# Patient Record
Sex: MALE | Race: OTHER | HISPANIC OR LATINO | Employment: UNEMPLOYED | URBAN - METROPOLITAN AREA
[De-identification: names, ages, dates, MRNs, and addresses within clinical notes are randomized per-mention and may not be internally consistent; named-entity substitution may affect disease eponyms.]

---

## 2019-02-21 ENCOUNTER — OFFICE VISIT (OUTPATIENT)
Dept: FAMILY MEDICINE CLINIC | Facility: CLINIC | Age: 39
End: 2019-02-21
Payer: COMMERCIAL

## 2019-02-21 VITALS
BODY MASS INDEX: 27.82 KG/M2 | DIASTOLIC BLOOD PRESSURE: 70 MMHG | OXYGEN SATURATION: 97 % | HEART RATE: 82 BPM | HEIGHT: 65 IN | WEIGHT: 167 LBS | SYSTOLIC BLOOD PRESSURE: 120 MMHG | TEMPERATURE: 98.2 F

## 2019-02-21 DIAGNOSIS — R22.2 LUMP IN CHEST: ICD-10-CM

## 2019-02-21 DIAGNOSIS — F33.1 MODERATE EPISODE OF RECURRENT MAJOR DEPRESSIVE DISORDER (HCC): Primary | ICD-10-CM

## 2019-02-21 DIAGNOSIS — R09.81 NASAL CONGESTION: ICD-10-CM

## 2019-02-21 DIAGNOSIS — Z23 NEED FOR INFLUENZA VACCINATION: ICD-10-CM

## 2019-02-21 PROCEDURE — 3725F SCREEN DEPRESSION PERFORMED: CPT | Performed by: FAMILY MEDICINE

## 2019-02-21 PROCEDURE — 3008F BODY MASS INDEX DOCD: CPT | Performed by: FAMILY MEDICINE

## 2019-02-21 PROCEDURE — 99214 OFFICE O/P EST MOD 30 MIN: CPT | Performed by: FAMILY MEDICINE

## 2019-02-21 RX ORDER — FLUTICASONE PROPIONATE 50 MCG
2 SPRAY, SUSPENSION (ML) NASAL DAILY
Qty: 16 G | Refills: 0 | Status: SHIPPED | OUTPATIENT
Start: 2019-02-21 | End: 2019-03-26 | Stop reason: ALTCHOICE

## 2019-02-21 RX ORDER — CITALOPRAM 10 MG/1
10 TABLET ORAL DAILY
Qty: 30 TABLET | Refills: 1 | Status: SHIPPED | OUTPATIENT
Start: 2019-02-21 | End: 2019-03-26 | Stop reason: DRUGHIGH

## 2019-02-21 NOTE — PROGRESS NOTES
Subjective:   Chief Complaint   Patient presents with    Depression        Patient ID: Wallace Ledesma is a 43 y o  male  Patient and office concerned about the depression patient in he had been feeling down unhappy a crying every day have difficulty time with sleeping have a difficulty with relation with his family and his kids and this is has been going on for many years and he been refusing to ask for help the patient at age 11 his that he commit suicide and feel this is a non affect him mentally since then he did not see any therapist and he was not on any medication patient deny any suicide attempt diet or idea  Also patient concerned about nasal congestion it has been going on for more than 1 months postnasal drip the runny nose and no fever no chills no decrease in oral intake no sick contact patient does smoke every day half to 1 pack a day  Patient also concerned about the lump on the anterior chest wall is not painful no redness no change in the size but that is there and it is bother him and had concerned about being neck cancer no family history of skin cancer      The following portions of the patient's history were reviewed and updated as appropriate: allergies, current medications, past family history, past medical history, past social history, past surgical history and problem list     Review of Systems   HENT: Positive for postnasal drip and rhinorrhea  Negative for ear pain, sinus pressure and sinus pain  Eyes: Negative for pain and redness  Respiratory: Negative for chest tightness and shortness of breath  Cardiovascular: Negative for palpitations and leg swelling  Gastrointestinal: Negative for blood in stool, constipation and diarrhea  Genitourinary: Negative for flank pain, frequency and hematuria  Musculoskeletal: Negative for back pain  Neurological: Negative for dizziness  Hematological: Does not bruise/bleed easily     Psychiatric/Behavioral: Positive for decreased concentration and sleep disturbance  Negative for self-injury and suicidal ideas  The patient is not nervous/anxious  Depress mood,         Objective:  Vitals:    02/21/19 1549   BP: 120/70   Pulse: 82   Temp: 98 2 °F (36 8 °C)   TempSrc: Oral   SpO2: 97%   Weight: 75 8 kg (167 lb)   Height: 5' 4 5" (1 638 m)      Physical Exam   Constitutional: He is oriented to person, place, and time  He appears well-developed and well-nourished  HENT:   Head: Normocephalic  Right Ear: External ear normal    Left Ear: External ear normal    Eyes: Conjunctivae and EOM are normal  Right eye exhibits no discharge  Left eye exhibits no discharge  Neck: No JVD present  Cardiovascular: Normal rate, regular rhythm and normal heart sounds  Exam reveals no gallop  No murmur heard  Pulmonary/Chest: Effort normal  No respiratory distress  He has no wheezes  He has no rales  He exhibits no tenderness  Abdominal: He exhibits no mass  There is no tenderness  There is no rebound  Musculoskeletal: He exhibits no edema or tenderness  Neurological: He is alert and oriented to person, place, and time  Skin: No rash noted  No erythema  Palpable small lump 1 cm in size on anterior chest wll ,no erythema no tenderness          Assessment/Plan:     Moderate episode of recurrent major depressive disorder (HCC)  Acute symptomatic will start the patient on citalopram 10 mg once a day proper use of medication possible side effect discussed with the patient will refer the patient to see Psychology    Nasal congestion  Acute symptomatic Flonase nasal spray 2 spray in each nostril once a day for 2 weeks in the road proper use of medication possible side effect discussed with the patient    Lump in chest  A new diagnosis the most probably lipoma plan to do ultrasound of the a lump the we discussed the patient not sure of the problem and the treatment is only surgical excision he will hold on it until see the result of the ultrasound       Diagnoses and all orders for this visit:    Moderate episode of recurrent major depressive disorder (HCC)  -     citalopram (CeleXA) 10 mg tablet; Take 1 tablet (10 mg total) by mouth daily  -     Ambulatory referral to Psychology; Future    Nasal congestion  -     fluticasone (FLONASE) 50 mcg/act nasal spray; 2 sprays into each nostril daily    Lump in chest  -     US chest (lungs/pleural cavity);  Future    Need for influenza vaccination  -     SYRINGE/SINGLE-DOSE VIAL: influenza vaccine, 3046-0036, quadrivalent, 0 5 mL, preservative-free, for patients 3+ yr (2 [facility])

## 2019-02-22 PROBLEM — R22.2 LUMP IN CHEST: Status: ACTIVE | Noted: 2019-02-22

## 2019-02-22 PROBLEM — R09.81 NASAL CONGESTION: Status: ACTIVE | Noted: 2019-02-22

## 2019-02-22 PROBLEM — F33.1 MODERATE EPISODE OF RECURRENT MAJOR DEPRESSIVE DISORDER (HCC): Status: ACTIVE | Noted: 2019-02-22

## 2019-02-22 NOTE — ASSESSMENT & PLAN NOTE
A new diagnosis the most probably lipoma plan to do ultrasound of the a lump the we discussed the patient not sure of the problem and the treatment is only surgical excision he will hold on it until see the result of the ultrasound

## 2019-02-22 NOTE — ASSESSMENT & PLAN NOTE
Acute symptomatic Flonase nasal spray 2 spray in each nostril once a day for 2 weeks in the road proper use of medication possible side effect discussed with the patient

## 2019-02-22 NOTE — ASSESSMENT & PLAN NOTE
Acute symptomatic will start the patient on citalopram 10 mg once a day proper use of medication possible side effect discussed with the patient will refer the patient to see Psychology

## 2019-02-25 ENCOUNTER — HOSPITAL ENCOUNTER (OUTPATIENT)
Dept: ULTRASOUND IMAGING | Facility: HOSPITAL | Age: 39
Discharge: HOME | End: 2019-02-25
Payer: COMMERCIAL

## 2019-02-25 DIAGNOSIS — R22.2 LUMP IN CHEST: ICD-10-CM

## 2019-02-25 PROCEDURE — 76705 ECHO EXAM OF ABDOMEN: CPT

## 2019-03-26 ENCOUNTER — OFFICE VISIT (OUTPATIENT)
Dept: FAMILY MEDICINE CLINIC | Facility: CLINIC | Age: 39
End: 2019-03-26
Payer: COMMERCIAL

## 2019-03-26 VITALS
SYSTOLIC BLOOD PRESSURE: 120 MMHG | TEMPERATURE: 98.1 F | HEART RATE: 76 BPM | BODY MASS INDEX: 27.16 KG/M2 | HEIGHT: 65 IN | OXYGEN SATURATION: 98 % | WEIGHT: 163 LBS | DIASTOLIC BLOOD PRESSURE: 80 MMHG

## 2019-03-26 DIAGNOSIS — E66.3 OVERWEIGHT (BMI 25.0-29.9): ICD-10-CM

## 2019-03-26 DIAGNOSIS — Z00.01 ENCOUNTER FOR WELL ADULT EXAM WITH ABNORMAL FINDINGS: Primary | ICD-10-CM

## 2019-03-26 DIAGNOSIS — F33.1 MODERATE EPISODE OF RECURRENT MAJOR DEPRESSIVE DISORDER (HCC): ICD-10-CM

## 2019-03-26 DIAGNOSIS — F17.210 SMOKING GREATER THAN 20 PACK YEARS: ICD-10-CM

## 2019-03-26 DIAGNOSIS — D17.1 LIPOMA OF TORSO: ICD-10-CM

## 2019-03-26 PROCEDURE — 3008F BODY MASS INDEX DOCD: CPT | Performed by: FAMILY MEDICINE

## 2019-03-26 PROCEDURE — 99406 BEHAV CHNG SMOKING 3-10 MIN: CPT | Performed by: FAMILY MEDICINE

## 2019-03-26 PROCEDURE — 3725F SCREEN DEPRESSION PERFORMED: CPT | Performed by: FAMILY MEDICINE

## 2019-03-26 PROCEDURE — 99395 PREV VISIT EST AGE 18-39: CPT | Performed by: FAMILY MEDICINE

## 2019-03-26 PROCEDURE — 99214 OFFICE O/P EST MOD 30 MIN: CPT | Performed by: FAMILY MEDICINE

## 2019-03-26 RX ORDER — CITALOPRAM 20 MG/1
20 TABLET ORAL DAILY
Qty: 30 TABLET | Refills: 2 | Status: SHIPPED | OUTPATIENT
Start: 2019-03-26 | End: 2020-03-19

## 2019-03-26 RX ORDER — NICOTINE 21 MG/24HR
1 PATCH, TRANSDERMAL 24 HOURS TRANSDERMAL EVERY 24 HOURS
Qty: 28 PATCH | Refills: 0 | Status: SHIPPED | OUTPATIENT
Start: 2019-03-26 | End: 2020-03-19

## 2019-03-26 NOTE — ASSESSMENT & PLAN NOTE
Asymptomatic uncontrolled will increase citalopram to 20 mg 1 tablet once a day proper use of medication possible side effect discussed with the patient

## 2019-03-26 NOTE — ASSESSMENT & PLAN NOTE
finding on ultrasound the of the anterior chest wall and plan to refer to surgeon patient concerned about it would like to be removed

## 2019-03-26 NOTE — PROGRESS NOTES
Subjective:   Chief Complaint   Patient presents with    Physical Exam    Follow-up     1 month f/u        Patient ID: Isaiah Cohen is a 43 y o  male  Patient office for his annual physical exam follow-up with the depression and patient was started on citalopram and he just started 3 weeks ago patient does feel very slight improvement the head deny any crying spell sleep the no improvement and his smooth still going into the depressed mood affect on his relation with the family but he deny any suicide attempt or idea patient did try her to start the the psychotherapist and he was not successful with the therapist he did not have connection with her patient tolerated the citalopram without any side effect      The following portions of the patient's history were reviewed and updated as appropriate: allergies, current medications, past family history, past medical history, past social history, past surgical history and problem list     Review of Systems   HENT: Negative for ear pain, sinus pressure and sinus pain  Eyes: Negative for pain and redness  Respiratory: Negative for chest tightness and shortness of breath  Cardiovascular: Negative for palpitations and leg swelling  Gastrointestinal: Negative for blood in stool, constipation and diarrhea  Genitourinary: Negative for flank pain, frequency and hematuria  Musculoskeletal: Negative for back pain  Neurological: Negative for dizziness  Hematological: Does not bruise/bleed easily  Psychiatric/Behavioral: Negative for self-injury and suicidal ideas  Objective:  Vitals:    03/26/19 1554   BP: 120/80   Pulse: 76   Temp: 98 1 °F (36 7 °C)   TempSrc: Oral   SpO2: 98%   Weight: 73 9 kg (163 lb)   Height: 5' 4 5" (1 638 m)      Physical Exam   Constitutional: He is oriented to person, place, and time  He appears well-developed and well-nourished  HENT:   Head: Normocephalic     Right Ear: External ear normal    Left Ear: External ear normal    Eyes: Conjunctivae and EOM are normal  Right eye exhibits no discharge  Left eye exhibits no discharge  Neck: No JVD present  Cardiovascular: Normal rate, regular rhythm and normal heart sounds  Exam reveals no gallop  No murmur heard  Pulmonary/Chest: Effort normal  No respiratory distress  He has no wheezes  He has no rales  He exhibits no tenderness  Abdominal: He exhibits no mass  There is no tenderness  There is no rebound  Musculoskeletal: He exhibits no edema or tenderness  Neurological: He is alert and oriented to person, place, and time  Skin: No rash noted  No erythema  Assessment/Plan: Moderate episode of recurrent major depressive disorder (HCC)  Asymptomatic uncontrolled will increase citalopram to 20 mg 1 tablet once a day proper use of medication possible side effect discussed with the patient    Smoking greater than 20 pack years  I advised patient to quit, and offered support  Discussed current use pattern  Asked patient to inform me when they set a quit date     At discussed with the patient complication of for smoking increase the risk of multiple kind of cancer he is aware  We offer him script for nicotine patch patient agree will start the patient on Decadron patch 21 mg 1 patch a day for 4 week proper use of medication possible side effect discussed with the patient                  Encounter for well adult exam with abnormal findings  Advice and education were given regarding nutrition, aerobic exercises, weight bearing exercises, cardiovascular risk reduction, fall risk reduction, and age appropriate supplements  The patient was counseled regarding instructions for management, risk factor reductions, prognosis, risks and benefits of treatment options, patient and family education, and importance of compliance with treatment     Patient declined a Pneumovax 23 and he declined Tdap      Lipoma of torso   finding on ultrasound the of the anterior chest wall and plan to refer to surgeon patient concerned about it would like to be removed    Overweight (BMI 25 0-29  9)  The BMI is above average  BMI counseling and education was provided to the patient  Nutrition recommendations include reducing portion sizes, decreasing overall calorie intake, 3-5 servings of fruits/vegetables daily, reducing fast food intake, consuming healthier snacks, decreasing soda and/or juice intake, moderation in carbohydrate intake and reducing intake of saturated fat and trans fat  Exercise recommendations include moderate aerobic physical activity for 150 minutes/week, exercising 3-5 times per week and joining a gym  Diagnoses and all orders for this visit:    Lipoma of torso  -     Cancel: Ambulatory referral to General Surgery; Future  -     Ambulatory referral to General Surgery; Future    Encounter for well adult exam with abnormal findings  -     CBC and differential; Future  -     Basic metabolic panel; Future  -     TSH, 3rd generation with Free T4 reflex; Future  -     Lipid panel; Future    Moderate episode of recurrent major depressive disorder (HCC)  -     citalopram (CeleXA) 20 mg tablet; Take 1 tablet (20 mg total) by mouth daily    Smoking greater than 20 pack years  -     nicotine (NICODERM CQ) 21 mg/24 hr TD 24 hr patch; Place 1 patch on the skin every 24 hours    Overweight (BMI 25 0-29  9)

## 2019-03-26 NOTE — ASSESSMENT & PLAN NOTE
Ultrasound show lipoma on the anterior chest wall patient concerned about it he will like to take it off for refer the patient to see sheila

## 2019-03-26 NOTE — PATIENT INSTRUCTIONS

## 2019-03-26 NOTE — PROGRESS NOTES
FAMILY PRACTICE HEALTH MAINTENANCE OFFICE VISIT  [facility]    NAME: Osmin Quiñones  AGE: 43 y o  SEX: male  : 1980     DATE: 3/26/2019    Assessment and Plan     Problem List Items Addressed This Visit        Other    Moderate episode of recurrent major depressive disorder ([address] )     Asymptomatic uncontrolled will increase citalopram to 20 mg 1 tablet once a day proper use of medication possible side effect discussed with the patient         Relevant Medications    nicotine (NICODERM CQ) 21 mg/24 hr TD 24 hr patch    citalopram (CeleXA) 20 mg tablet    Encounter for well adult exam with abnormal findings - Primary     Advice and education were given regarding nutrition, aerobic exercises, weight bearing exercises, cardiovascular risk reduction, fall risk reduction, and age appropriate supplements  The patient was counseled regarding instructions for management, risk factor reductions, prognosis, risks and benefits of treatment options, patient and family education, and importance of compliance with treatment  Patient declined a Pneumovax 23 and he declined Tdap           Relevant Orders    CBC and differential    Basic metabolic panel    TSH, 3rd generation with Free T4 reflex    Lipid panel    Smoking greater than 20 pack years     I advised patient to quit, and offered support  Discussed current use pattern  Asked patient to inform me when they set a quit date     At discussed with the patient complication of for smoking increase the risk of multiple kind of cancer he is aware  We offer him script for nicotine patch patient agree will start the patient on Decadron patch 21 mg 1 patch a day for 4 week proper use of medication possible side effect discussed with the patient                       Relevant Medications    nicotine (NICODERM CQ) 21 mg/24 hr TD 24 hr patch    Overweight (BMI 25 0-29  9)     The BMI is above average   BMI counseling and education was provided to the patient  Nutrition recommendations include reducing portion sizes, decreasing overall calorie intake, 3-5 servings of fruits/vegetables daily, reducing fast food intake, consuming healthier snacks, decreasing soda and/or juice intake, moderation in carbohydrate intake and reducing intake of saturated fat and trans fat  Exercise recommendations include moderate aerobic physical activity for 150 minutes/week, exercising 3-5 times per week and joining a gym  Lipoma of torso      finding on ultrasound the of the anterior chest wall and plan to refer to surgeon patient concerned about it would like to be removed         Relevant Orders    Ambulatory referral to General Surgery            · Patient Counseling:   · Nutrition: Stressed importance of a well balanced diet, moderation of sodium/saturated fat, caloric balance and sufficient intake of fiber  · Exercise: Stressed the importance of regular exercise with a goal of 150 minutes per week  · Dental Health: Discussed daily flossing and brushing and regular dental visits     · Immunizations reviewed patient is due for Pneumovax 23 patient is due for tetanus shot and he decline it  · Discussed benefits of screening the patient is due for blood work screening for hyperlipidemia and diabetic  BMI Counseling: Body mass index is 27 55 kg/m²  Discussed with patient's BMI with him         Chief Complaint     Chief Complaint   Patient presents with    Physical Exam    Follow-up     1 month f/u       History of Present Illness     Patient office for his annual exam and follow-up of low with the ultrasound the lump of the chest folic  depression   Patient deny any chest pain short of breath no palpitation no headache no blurred vision no weakness or lateralized of the symptom no abdomen pain nausea vomiting or diarrhea no renal problem no rash no fever no change in the weight and no cough no wheezing in no eye pain on or discharge and no ear pain and patient does smoke has been smoking more than 20 years and he is willing to could to smoking he does not do exercise and it isn't follow any special diet patient depression screen positive he had diagnosis of depression he is on citalopram 10 mg once a day      Well Adult Physical   Patient here for a comprehensive physical exam       Diet and Physical Activity  Diet:  Regular diet  Exercise: never      Depression Screen  PHQ-9 Depression Screening    PHQ-9:    Frequency of the following problems over the past two weeks:       Little interest or pleasure in doing things:  1 - several days  Feeling down, depressed, or hopeless:  1 - several days  Trouble falling or staying asleep, or sleeping too much:  3 - nearly every day  Feeling tired or having little energy:  3 - nearly every day  Poor appetite or overeatin - several days  Feeling bad about yourself - or that you are a failure or have let yourself or your family down:  1 - several days  Trouble concentrating on things, such as reading the newspaper or watching television:  1 - several days  Moving or speaking so slowly that other people could have noticed  Or the opposite - being so fidgety or restless that you have been moving around a lot more than usual:  0 - not at all  Thoughts that you would be better off dead, or of hurting yourself in some way:  0 - not at all  PHQ-2 Score:  2  PHQ-9 Score:  11          General Health  Hearing: Normal:  bilateral  Vision: no vision problems  Dental: regular dental visits      The following portions of the patient's history were reviewed and updated as appropriate: allergies, current medications, past family history, past medical history, past social history, past surgical history and problem list     Review of Systems     Review of Systems   Constitutional: Negative for fatigue and fever  HENT: Negative for ear pain, sinus pressure, sinus pain and sore throat      Eyes: Negative for pain and redness  Respiratory: Negative for cough, chest tightness and shortness of breath  Cardiovascular: Negative for chest pain, palpitations and leg swelling  Gastrointestinal: Negative for abdominal pain, blood in stool, constipation, diarrhea and nausea  Endocrine: Negative for heat intolerance and polydipsia  Genitourinary: Negative for flank pain, frequency and hematuria  Musculoskeletal: Negative for back pain and joint swelling  Skin: Negative for rash  Neurological: Negative for dizziness, numbness and headaches  Hematological: Does not bruise/bleed easily  Past Medical History     Past Medical History:   Diagnosis Date    Moderate episode of recurrent major depressive disorder ([facility] 2/22/2019       Past Surgical History     History reviewed  No pertinent surgical history      Social History     Social History     Socioeconomic History    Marital status: Single     Spouse name: None    Number of children: None    Years of education: None    Highest education level: None   Occupational History    None   Social Needs    Financial resource strain: Not hard at all   [place] insecurity:     Worry: Never true     Inability: Never true    Transportation needs:     Medical: No     Non-medical: No   Tobacco Use    Smoking status: Current Every Day Smoker    Smokeless tobacco: Current User   Substance and Sexual Activity    Alcohol use: Yes     Comment: occasional     Drug use: No    Sexual activity: Yes     Partners: Female   Lifestyle    Physical activity:     Days per week: 0 days     Minutes per session: 0 min    Stress: Rather much   Relationships    Social connections:     Talks on phone: More than three times a week     Gets together: More than three times a week     Attends Church service: Never     Active member of club or organization: No     Attends meetings of clubs or organizations: Never     Relationship status: Never     Intimate partner violence: Fear of current or ex partner: No     Emotionally abused: No     Physically abused: No     Forced sexual activity: No   Other Topics Concern    None   Social History Narrative    Children: Y    Hand dominance: R       Family History     Family History   Problem Relation Age of Onset    Asthma Mother     Hypertension Mother        Current Medications       Current Outpatient Medications:     citalopram (CeleXA) 20 mg tablet, Take 1 tablet (20 mg total) by mouth daily, Disp: 30 tablet, Rfl: 2    nicotine (NICODERM CQ) 21 mg/24 hr TD 24 hr patch, Place 1 patch on the skin every 24 hours, Disp: 28 patch, Rfl: 0     Allergies     No Known Allergies    Objective     /80   Pulse 76   Temp 98 1 °F (36 7 °C) (Oral)   Ht 5' 4 5" (1 638 m)   Wt 73 9 kg (163 lb)   SpO2 98%   BMI 27 55 kg/m²      Physical Exam   Constitutional: He is oriented to person, place, and time  He appears well-developed and well-nourished  HENT:   Head: Normocephalic  Right Ear: External ear normal    Left Ear: External ear normal    Eyes: Conjunctivae and EOM are normal  Right eye exhibits no discharge  Left eye exhibits no discharge  Neck: No JVD present  Cardiovascular: Normal rate, regular rhythm and normal heart sounds  Exam reveals no gallop  No murmur heard  Pulmonary/Chest: Effort normal  No respiratory distress  He has no wheezes  He has no rales  He exhibits no tenderness  Abdominal: He exhibits no mass  There is no tenderness  There is no rebound  Musculoskeletal: He exhibits no edema or tenderness  Neurological: He is alert and oriented to person, place, and time  Skin: No rash noted  No erythema  Psychiatric: He has a normal mood and affect  Mazin Stevenson MD  [facility]  BMI Counseling: Body mass index is 27 55 kg/m²  Discussed the patient's BMI with him  The BMI is above average  BMI counseling and education was provided to the patient   Nutrition recommendations include reducing portion sizes, decreasing overall calorie intake, 3-5 servings of fruits/vegetables daily, reducing fast food intake, consuming healthier snacks, decreasing soda and/or juice intake, moderation in carbohydrate intake and reducing intake of cholesterol  Exercise recommendations include moderate aerobic physical activity for 150 minutes/week

## 2019-03-26 NOTE — ASSESSMENT & PLAN NOTE
I advised patient to quit, and offered support  Discussed current use pattern  Asked patient to inform me when they set a quit date     At discussed with the patient complication of for smoking increase the risk of multiple kind of cancer he is aware  We offer him script for nicotine patch patient agree will start the patient on Decadron patch 21 mg 1 patch a day for 4 week proper use of medication possible side effect discussed with the patient

## 2019-03-26 NOTE — ASSESSMENT & PLAN NOTE
Advice and education were given regarding nutrition, aerobic exercises, weight bearing exercises, cardiovascular risk reduction, fall risk reduction, and age appropriate supplements  The patient was counseled regarding instructions for management, risk factor reductions, prognosis, risks and benefits of treatment options, patient and family education, and importance of compliance with treatment     Patient declined a Pneumovax 23 and he declined Tdap

## 2019-04-08 ENCOUNTER — CONSULT (OUTPATIENT)
Dept: SURGERY | Facility: CLINIC | Age: 39
End: 2019-04-08
Payer: COMMERCIAL

## 2019-04-08 VITALS
TEMPERATURE: 97.5 F | DIASTOLIC BLOOD PRESSURE: 78 MMHG | BODY MASS INDEX: 27.99 KG/M2 | HEIGHT: 65 IN | WEIGHT: 168 LBS | HEART RATE: 69 BPM | SYSTOLIC BLOOD PRESSURE: 126 MMHG

## 2019-04-08 DIAGNOSIS — D17.1 LIPOMA OF TORSO: ICD-10-CM

## 2019-04-08 PROCEDURE — 99243 OFF/OP CNSLTJ NEW/EST LOW 30: CPT | Performed by: PHYSICIAN ASSISTANT

## 2019-06-27 ENCOUNTER — OFFICE VISIT (OUTPATIENT)
Dept: SURGERY | Facility: CLINIC | Age: 39
End: 2019-06-27
Payer: COMMERCIAL

## 2019-06-27 VITALS
WEIGHT: 169.8 LBS | BODY MASS INDEX: 28.7 KG/M2 | SYSTOLIC BLOOD PRESSURE: 122 MMHG | DIASTOLIC BLOOD PRESSURE: 80 MMHG | TEMPERATURE: 98.3 F

## 2019-06-27 DIAGNOSIS — R22.2 LUMP IN CHEST: ICD-10-CM

## 2019-06-27 DIAGNOSIS — D17.1 LIPOMA OF TORSO: Primary | ICD-10-CM

## 2019-06-27 PROCEDURE — 99213 OFFICE O/P EST LOW 20 MIN: CPT | Performed by: PHYSICIAN ASSISTANT

## 2019-06-27 NOTE — H&P (VIEW-ONLY)
Assessment/Plan:   Brandon Horvaht is a 43 y o male who is here for The primary encounter diagnosis was Lipoma of torso  A diagnosis of Lump in chest was also pertinent to this visit  On exam found to have lipoma x 3 of the : mid sternum chest wall  Plan: Excise lesion(s) under anesthesia in the operating room      Positioning: supine    Post Op Pain Management:   Motrin and Tylenol    - Patient has been instructed to avoid herbs or non-directed vitamins the week prior to surgery to ensure no drug interactions with perioperative surgical and anesthetic medications  - Patient has been instructed to avoid aspirin containing medications or non-steroidal anti-inflammatory drugs for SEVEN days preceding surgery  Preoperative Clearance: None    Physical Exam   Pulmonary/Chest:             _______________________________________________________  CC:Lipoma (on chest )    HPI:  Brandon Horvath is a 43 y o male who was referred for evaluation of Lipoma (on chest )    Currently patient reports growing masses of chest      Reports: growing    Location: chest      ROS:  General ROS: negative  negative for - chills, fatigue, fever or night sweats, weight loss  Respiratory ROS: no cough, shortness of breath, or wheezing  Cardiovascular ROS: no chest pain or dyspnea on exertion  Genito-Urinary ROS: no dysuria, trouble voiding, or hematuria  Musculoskeletal ROS: negative for - gait disturbance, joint pain or muscle pain  Neurological ROS: no TIA or stroke symptoms  Skin ROS: See HPI  GI ROS: see HPI  Skin ROS: no new rashes or lesions   Lymphatic ROS: no new adenopathy noted by pt     GYN ROS: see HPI, no new GYN history or bleeding noted  Psy ROS: no new mental or behavioral disturbances       Patient Active Problem List   Diagnosis    Moderate episode of recurrent major depressive disorder (HCC)    Nasal congestion    Lump in chest    Encounter for well adult exam with abnormal findings    Smoking greater than 20 pack years    Overweight (BMI 25 0-29  9)    Lipoma of torso         Allergies:  Patient has no known allergies  Current Outpatient Medications:     citalopram (CeleXA) 20 mg tablet, Take 1 tablet (20 mg total) by mouth daily (Patient not taking: Reported on 6/27/2019), Disp: 30 tablet, Rfl: 2    nicotine (NICODERM CQ) 21 mg/24 hr TD 24 hr patch, Place 1 patch on the skin every 24 hours (Patient not taking: Reported on 6/27/2019), Disp: 28 patch, Rfl: 0    Past Medical History:   Diagnosis Date    Moderate episode of recurrent major depressive disorder ([facility] [address] ) 2/22/2019       No past surgical history on file  Family History   Problem Relation Age of Onset    Asthma Mother     Hypertension Mother         reports that he has been smoking  He uses smokeless tobacco  He reports that he drinks alcohol  He reports that he does not use drugs  Vitals:    06/27/19 1526   BP: 122/80   Temp: 98 3 °F (36 8 °C)        PHYSICAL EXAM  General Appearance:    Alert, cooperative, no distress   Head:    Normocephalic without obvious abnormality   Eyes:    PERRL, conjunctiva/corneas clear, EOM's intact        Neck:   Supple, no adenopathy, no JVD   Back:     Symmetric, no spinal or CVA tenderness   Lungs:     Clear to auscultation bilaterally, no wheezing or rhonchi   Heart:    Regular rate and rhythm, S1 and S2 normal, no murmur   Abdomen:     Benign, no rebound or guarding  Extremities:   Extremities normal  No clubbing, cyanosis or edema   Psych:   Normal Affect, AOx3  Neurologic:  Skin:   CNII-XII intact  Strength symmetric, speech intact    Warm, dry, intact, no visible rashes or lesions except as follows: 3 ,2 cm masses of mid setrnum               Some portions of this record may have been generated with voice recognition software  There may be translation, syntax,  or grammatical errors   Occasional wrong word or "sound-a-like" substitutions may have occurred due to the inherent limitations of the voice recognition software  Read the chart carefully and recognize, using context, where substitutions may have occurred  If you have any questions, please contact the dictating provider for clarification or correction, as needed  This encounter has been coded by a non-certified coder         Nathen Sacks, MD    Date: 6/27/2019 Time: 3:33 PM

## 2019-07-08 NOTE — PRE-PROCEDURE INSTRUCTIONS
No outpatient medications have been marked as taking for the 7/11/19 encounter [facility] Encounter)  Patient given/ instructed on use of Dial antibac soap per hospital protocol    Patient instructed to stop all ASA, NSAIDS, vitamins and herbal supplements one week prior to surgery or per Dr Piter Olivares

## 2019-07-10 ENCOUNTER — ANESTHESIA EVENT (OUTPATIENT)
Dept: PERIOP | Facility: HOSPITAL | Age: 39
End: 2019-07-10
Payer: COMMERCIAL

## 2019-07-11 ENCOUNTER — ANESTHESIA (OUTPATIENT)
Dept: PERIOP | Facility: HOSPITAL | Age: 39
End: 2019-07-11
Payer: COMMERCIAL

## 2019-07-11 ENCOUNTER — HOSPITAL ENCOUNTER (OUTPATIENT)
Facility: HOSPITAL | Age: 39
Setting detail: OUTPATIENT SURGERY
Discharge: HOME | End: 2019-07-11
Attending: SURGERY | Admitting: SURGERY
Payer: COMMERCIAL

## 2019-07-11 VITALS
HEART RATE: 53 BPM | RESPIRATION RATE: 20 BRPM | DIASTOLIC BLOOD PRESSURE: 70 MMHG | TEMPERATURE: 97.7 F | BODY MASS INDEX: 28.32 KG/M2 | HEIGHT: 65 IN | OXYGEN SATURATION: 100 % | SYSTOLIC BLOOD PRESSURE: 108 MMHG | WEIGHT: 170 LBS

## 2019-07-11 DIAGNOSIS — D17.1 LIPOMA OF TORSO: Primary | ICD-10-CM

## 2019-07-11 PROCEDURE — 21555 EXC NECK LES SC < 3 CM: CPT | Performed by: SURGERY

## 2019-07-11 PROCEDURE — 88304 TISSUE EXAM BY PATHOLOGIST: CPT | Performed by: PATHOLOGY

## 2019-07-11 RX ORDER — SODIUM CHLORIDE 9 MG/ML
125 INJECTION, SOLUTION INTRAVENOUS CONTINUOUS
Status: DISCONTINUED | OUTPATIENT
Start: 2019-07-11 | End: 2019-07-11 | Stop reason: HOSPADM

## 2019-07-11 RX ORDER — ONDANSETRON 2 MG/ML
INJECTION INTRAMUSCULAR; INTRAVENOUS AS NEEDED
Status: DISCONTINUED | OUTPATIENT
Start: 2019-07-11 | End: 2019-07-11 | Stop reason: SURG

## 2019-07-11 RX ORDER — ACETAMINOPHEN 325 MG/1
650 TABLET ORAL EVERY 6 HOURS PRN
Status: DISCONTINUED | OUTPATIENT
Start: 2019-07-11 | End: 2019-07-11 | Stop reason: HOSPADM

## 2019-07-11 RX ORDER — ONDANSETRON 2 MG/ML
4 INJECTION INTRAMUSCULAR; INTRAVENOUS ONCE AS NEEDED
Status: DISCONTINUED | OUTPATIENT
Start: 2019-07-11 | End: 2019-07-11 | Stop reason: HOSPADM

## 2019-07-11 RX ORDER — ONDANSETRON 2 MG/ML
4 INJECTION INTRAMUSCULAR; INTRAVENOUS EVERY 8 HOURS PRN
Status: DISCONTINUED | OUTPATIENT
Start: 2019-07-11 | End: 2019-07-11 | Stop reason: HOSPADM

## 2019-07-11 RX ORDER — HYDROMORPHONE HCL/PF 1 MG/ML
1 SYRINGE (ML) INJECTION
Status: DISCONTINUED | OUTPATIENT
Start: 2019-07-11 | End: 2019-07-11 | Stop reason: HOSPADM

## 2019-07-11 RX ORDER — ONDANSETRON 4 MG/1
4 TABLET, ORALLY DISINTEGRATING ORAL EVERY 8 HOURS PRN
Status: DISCONTINUED | OUTPATIENT
Start: 2019-07-11 | End: 2019-07-11 | Stop reason: HOSPADM

## 2019-07-11 RX ORDER — FENTANYL CITRATE/PF 50 MCG/ML
50 SYRINGE (ML) INJECTION
Status: DISCONTINUED | OUTPATIENT
Start: 2019-07-11 | End: 2019-07-11 | Stop reason: HOSPADM

## 2019-07-11 RX ORDER — DEXTROSE AND SODIUM CHLORIDE 5; .45 G/100ML; G/100ML
80 INJECTION, SOLUTION INTRAVENOUS CONTINUOUS
Status: DISCONTINUED | OUTPATIENT
Start: 2019-07-11 | End: 2019-07-11 | Stop reason: HOSPADM

## 2019-07-11 RX ORDER — CEFAZOLIN SODIUM 1 G/50ML
1000 SOLUTION INTRAVENOUS ONCE
Status: COMPLETED | OUTPATIENT
Start: 2019-07-11 | End: 2019-07-11

## 2019-07-11 RX ORDER — DEXAMETHASONE SODIUM PHOSPHATE 4 MG/ML
INJECTION, SOLUTION INTRA-ARTICULAR; INTRALESIONAL; INTRAMUSCULAR; INTRAVENOUS; SOFT TISSUE AS NEEDED
Status: DISCONTINUED | OUTPATIENT
Start: 2019-07-11 | End: 2019-07-11 | Stop reason: SURG

## 2019-07-11 RX ORDER — HYDROCODONE BITARTRATE AND ACETAMINOPHEN 5; 325 MG/1; MG/1
1 TABLET ORAL EVERY 4 HOURS PRN
Qty: 5 TABLET | Refills: 0 | Status: SHIPPED | OUTPATIENT
Start: 2019-07-11 | End: 2020-03-19

## 2019-07-11 RX ORDER — HYDROCODONE BITARTRATE AND ACETAMINOPHEN 5; 325 MG/1; MG/1
1 TABLET ORAL EVERY 4 HOURS PRN
Status: DISCONTINUED | OUTPATIENT
Start: 2019-07-11 | End: 2019-07-11 | Stop reason: HOSPADM

## 2019-07-11 RX ORDER — PROPOFOL 10 MG/ML
INJECTION, EMULSION INTRAVENOUS AS NEEDED
Status: DISCONTINUED | OUTPATIENT
Start: 2019-07-11 | End: 2019-07-11 | Stop reason: SURG

## 2019-07-11 RX ORDER — MIDAZOLAM HYDROCHLORIDE 1 MG/ML
INJECTION INTRAMUSCULAR; INTRAVENOUS AS NEEDED
Status: DISCONTINUED | OUTPATIENT
Start: 2019-07-11 | End: 2019-07-11 | Stop reason: SURG

## 2019-07-11 RX ORDER — FENTANYL CITRATE 50 UG/ML
INJECTION, SOLUTION INTRAMUSCULAR; INTRAVENOUS AS NEEDED
Status: DISCONTINUED | OUTPATIENT
Start: 2019-07-11 | End: 2019-07-11 | Stop reason: SURG

## 2019-07-11 RX ADMIN — FENTANYL CITRATE 25 MCG: 50 INJECTION, SOLUTION INTRAMUSCULAR; INTRAVENOUS at 10:45

## 2019-07-11 RX ADMIN — FENTANYL CITRATE 25 MCG: 50 INJECTION, SOLUTION INTRAMUSCULAR; INTRAVENOUS at 10:55

## 2019-07-11 RX ADMIN — CEFAZOLIN SODIUM 1000 MG: 1 SOLUTION INTRAVENOUS at 10:40

## 2019-07-11 RX ADMIN — MIDAZOLAM 2 MG: 1 INJECTION INTRAMUSCULAR; INTRAVENOUS at 10:30

## 2019-07-11 RX ADMIN — SODIUM CHLORIDE 125 ML/HR: 0.9 INJECTION, SOLUTION INTRAVENOUS at 08:40

## 2019-07-11 RX ADMIN — LIDOCAINE HYDROCHLORIDE 100 MG: 20 INJECTION, SOLUTION INTRAVENOUS at 10:42

## 2019-07-11 RX ADMIN — PROPOFOL 200 MG: 10 INJECTION, EMULSION INTRAVENOUS at 10:42

## 2019-07-11 RX ADMIN — FENTANYL CITRATE 25 MCG: 50 INJECTION, SOLUTION INTRAMUSCULAR; INTRAVENOUS at 10:50

## 2019-07-11 RX ADMIN — ONDANSETRON HYDROCHLORIDE 4 MG: 2 INJECTION, SOLUTION INTRAVENOUS at 10:30

## 2019-07-11 RX ADMIN — DEXAMETHASONE SODIUM PHOSPHATE 4 MG: 4 INJECTION, SOLUTION INTRAMUSCULAR; INTRAVENOUS at 10:51

## 2019-07-11 RX ADMIN — FENTANYL CITRATE 25 MCG: 50 INJECTION, SOLUTION INTRAMUSCULAR; INTRAVENOUS at 10:42

## 2019-07-11 NOTE — DISCHARGE SUMMARY
Discharge Summary - Sachi Mejía 43 y o  male MRN: [id number]    Unit/Bed#: [facility] Encounter: [id number]      Pre-Operative Diagnosis: Pre-Op Diagnosis Codes:     * Lipoma of torso [D17 1]    Post-Operative Diagnosis: Post-Op Diagnosis Codes:     * Lipoma of torso [D17 1]    Procedures Performed:  Procedure(s):  EXCISION LIPOMA X2 MID STERNUM CHEST WALL    Surgeon: Ellie Ken MD    See H & P for full details of admission and Operative Note for full details of operations performed  Patient was seen and examined prior to discharge  Provisions for Follow-Up Care:  See After Visit Summary for information related to follow-up care and home orders  Disposition: Home, in stable condition  Planned Readmission: No    Discharge Medications:  See after visit summary for reconciled discharge medications provided to patient and family  Post Operative instructions: Reviewed with patient and/or family  Some portions of this record may have been generated with voice recognition software  There may be translation, syntax,  or grammatical errors  Occasional wrong word or "sound-a-like" substitutions may have occurred due to the inherent limitations of the voice recognition software  Read the chart carefully and recognize, using context, where substitutions may have occurred  If you have any questions, please contact the dictating provider for clarification or correction, as needed  This encounter has been coded by non certified Coder      Signature:   Ellie Ken MD  Date: 7/11/2019 Time: 12:07 PM

## 2019-07-11 NOTE — OP NOTE
EXCISION LIPOMA X2 MID STERNUM CHEST WALL  Postoperative Note  PATIENT NAME: Osmin Pham  : 1980  MRN: [id number]  [facility] ROOM 08    Surgery Date: 2019    Pre operative diagnosis:   Lipoma of torso [D17 1]    Operative Indications:  Soft tissue mass    Operative Findings:  2 cm lipoma left chest wall  2 cm lobulated lipoma right chest wall    Consent:  The risks, benefits, and alternatives to the surgery were discussed with the patient and with the family prior to surgery, personally by Dr Yanique Hickman  If the consent was obtained by the physician assistant or other representative, the consent was reviewed once again personally by the operating physician  Common complications particular for this procedure as well as unusual complications were discussed, including but not limited to:  bleeding, wound infection, prolonged wound healing, open wounds, reoperation and/or recurrence of the lesion  A  was used if necessary  The patient expressed understanding of the issues discussed and wished and consented to the procedure to proceed  All questions were answered  Dr Yanique Hickman personally discussed the informed consent with this patient  Post operative diagnosis :and findings  Post-Op Diagnosis Codes:     * Lipoma of torso [D17 1]    Procedure:   Procedure(s):  EXCISION LIPOMA X2 MID STERNUM CHEST WALL    Surgeon(s) and Role:     * Rod Salcido MD - Primary    The Physician Assistant was medically necessary for surgical safety the case including suturing, retraction, and hemostasis  No qualified resident was available  I was present for the entire procedure       Drains:  * No LDAs found *    Specimens:  ID Type Source Tests Collected by Time Destination   1 : right Tissue Soft Tissue, Lipoma TISSUE EXAM Rod Salcido MD 2019 1041    2 : left Tissue Soft Tissue, Lipoma TISSUE EXAM Rod Salcido MD 2019 1042        Estimated Blood Loss:   Minimal    Anesthesia Type: Choice     Procedure: The patient was seen in the Holding Room  The risks, benefits, complications, treatment options, and expected outcomes were discussed with the patient  The possibilities of reaction to medication, bleeding, infection, the need for additional procedures, failure to diagnose a condition, and creating a complication operation were discussed with the patient  The patient concurred with the proposed plan, giving informed consent  The site of surgery properly noted/marked  The patient was taken to Operating Room, identified as Cb and staff verified the patient name, , site, and laterality, if applicable  A Time Out was held and the above information confirmed  The patient was placed supine  The chest was prepped and draped in standard fashion  Local anesthesia was used to anesthetize the skin surrounding a 2 cm lesion  Beginning on the left side:   A oblique elliptical incision was made over the lesion  Sharp and blunt dissection were used to mobilize the mass which was in a subcutaneous location  Hemostasis was achieved with cautery  Scissors, knife, and cautery were used in the excision so that 2mm  margins were taken around the lesion  Tissue removed: without necrosis, devitalization, and non viable tissue     Type Tissue removed: skin, subcutaneous tissue and fat    Closure was achieved a with layered closure utilizing a 3-0 Vicryl subcutaneous layer and a  4-0 Monocryl subcuticular stitch  Attention to was turned to the right side  A oblique elliptical incision was made over the lesion  Sharp and blunt dissection were used to mobilize the mass which was in a subcutaneous location  Hemostasis was achieved with cautery  Scissors, knife, and cautery were used in the excision so that 2mm  margins were taken around the lesion        Tissue removed: without necrosis, devitalization, and non viable tissue     Type Tissue removed: skin, subcutaneous tissue and fat    Closure was achieved a with layered closure utilizing a 3-0 Vicryl subcutaneous layer and a  4-0 Monocryl subcuticular stitch  Steristrips   applied and the wound dressed  At the end of the operation, all sponge, instrument, and needle counts were correct  Skin and soft tissue/subcutaneous tissue and fat were removed along with the mass  Some portions of this records may have been generated with voice recognition software  There may be translation, syntax,  or grammatical errors  Occasional wrong word or "sound-a-like" substitutions may have occurred due to the inherent limitations of the voice recognition software  Read the chart carefully and recognize, using context, where substations may have occurred  If you have any questions, please contact the dictating provider for clarification or correction, as needed         Complications: None    Condition: Stable to PACU    SIGNATURE: Payton Engel MD   DATE: July 11, 2019   TIME: 11:12 AM

## 2019-07-11 NOTE — ANESTHESIA PREPROCEDURE EVALUATION
Review of Systems/Medical History  Patient summary reviewed  Chart reviewed      Cardiovascular  Negative cardio ROS    Pulmonary  Smoker cigarette smoker  , Tobacco cessation counseling given ,        GI/Hepatic  Negative GI/hepatic ROS          Negative  ROS        Endo/Other  Negative endo/other ROS      GYN  Negative gynecology ROS          Hematology  Negative hematology ROS      Musculoskeletal  Negative musculoskeletal ROS        Neurology  Negative neurology ROS      Psychology   Depression ,              Physical Exam    Airway    Mallampati score: II  TM Distance: >3 FB  Neck ROM: full     Dental   No notable dental hx     Cardiovascular  Comment: Negative ROS, Rhythm: regular, Rate: normal, Cardiovascular exam normal    Pulmonary  Pulmonary exam normal Breath sounds clear to auscultation,     Other Findings        Anesthesia Plan  ASA Score- 2     Anesthesia Type- general with ASA Monitors  Additional Monitors:   Airway Plan: LMA  Plan Factors- Patient instructed to abstain from smoking on day of procedure  Patient smoked on day of surgery  Induction- intravenous  Postoperative Plan- Plan for postoperative opioid use  Informed Consent- Anesthetic plan and risks discussed with patient

## 2019-07-11 NOTE — DISCHARGE INSTRUCTIONS
[facility] Instructions  Dr Arna Simmonds MD, FACS    1  General: You will feel pulling sensations around the wound or funny aches and pains around the incisions  This is normal  Even minor surgery is a change in your body and this is your bodys way of reaction to it  If you have had abdominal surgery, it may help to support the incision with a small pillow or blanket for comfort when moving or coughing  2  Wound care: Make sure to remove the bandage in about 24 hours, unless instructed otherwise  You usually don't have to redress the wound after 24-48 hours, unless for comfort  Keep the incision clean and dry  Let air get to it  If this Steri-Strips fall off, just keep the wound clean  3  Water: You may shower over the wound, unless there are drain tubes left in place  Do not bathe or use a pool or hot tub until cleared by the physician  You may shower right over the staples or Steri-Strips and packing dry when you are done  4  Activity: You may go up and down stairs, walk as much as you are comfortable, but walk at least 3 times each day  If you have had abdominal surgery, do not lift anything heavier than 15 pounds for at least 2-4 weeks, unless cleared by the doctor  5  Diet: You may resume a regular diet  If you had a same-day surgery or overnight stay surgery, you may wish to eat lightly for a few days: soups, crackers, and sandwiches  You may resume a regular diet when ready  6  Medications: Resume all of your previous medications, unless told otherwise by the doctor  Avoid aspirin or ibuprofen (Advil, Motrin, etc ) products for 2-3 days after the date of surgery  You may, at that time, began to take them again  Tylenol is always fine, unless you are taking any narcotic pain medication containing Tylenol (such as Percocet, Darvocet, Vicodin, or anything containing acetaminophen)  Do not take Tylenol if you're taking these medications   You do not need to take the narcotic pain medications unless you are having significant pain and discomfort  7  Driving: You will need someone to drive you home on the day of surgery  Do not drive or make any important decisions while on narcotic pain medication or 24 hours and after anesthesia or sedation for surgery  Generally, you may drive when your off all narcotic pain medications  8  Upset Stomach: You may take Maalox, Tums, or similar items for an upset stomach  If your narcotic pain medication causes an upset stomach, do not take it on an empty stomach  Try taking it with at least some crackers or toast      9  Constipation: Patients often experienced constipation after surgery  You may take over-the-counter medication for this, such as Metamucil, Senokot, Dulcolax, milk of magnesia, etc  You may take a suppository unless you have had anorectal surgery such as a procedure on your hemorrhoids  If you experience significant nausea or vomiting after abdominal surgery, call the office before trying any of these medications  10  Call the office: If you are experiencing any of the following, fevers above 101 5°, significant nausea or vomiting, if the wound develops drainage and/or is excessive redness around the wound, or if you have significant diarrhea or other worsening symptoms  11  Pain: You may be given a prescription for pain  This will be given to the hospital, the day of surgery  12  Sexual Activity: You may resume sexual activity when you feel ready and comfortable and your incision is sealed and healed without apparent infection risk  13  Urination: If you haven't urinated in 6 hours, go directly to the ER for evaluation for urinary retention       [address]  [place], [address]   Phone: [phone]

## 2019-07-12 ENCOUNTER — TELEPHONE (OUTPATIENT)
Dept: SURGERY | Facility: CLINIC | Age: 39
End: 2019-07-12

## 2019-07-12 NOTE — TELEPHONE ENCOUNTER
Spoke with patient states he feels fine  Denies any F/V/N/C and he has had a bowel movement  Patient is aware to call the office with any questions or concerns  Patient is aware he will receive a call when pathology is finalized and verified  Path pending

## 2019-07-16 NOTE — TELEPHONE ENCOUNTER
Pathology finalized and reviewed by Dr Avni Mitchell  Final Diagnosis   A  Soft tissue, right chest wall, excision:  -  Lipoma      B  Soft tissue, left chest wall, excision:  -  Lipoma  Called and spoke to patient, aware of results

## 2019-07-18 ENCOUNTER — OFFICE VISIT (OUTPATIENT)
Dept: SURGERY | Facility: CLINIC | Age: 39
End: 2019-07-18

## 2019-07-18 VITALS
SYSTOLIC BLOOD PRESSURE: 128 MMHG | TEMPERATURE: 97.3 F | BODY MASS INDEX: 28.72 KG/M2 | DIASTOLIC BLOOD PRESSURE: 78 MMHG | WEIGHT: 172.6 LBS

## 2019-07-18 DIAGNOSIS — D17.1 LIPOMA OF TORSO: Primary | ICD-10-CM

## 2019-07-18 PROCEDURE — 99024 POSTOP FOLLOW-UP VISIT: CPT | Performed by: PHYSICIAN ASSISTANT

## 2019-07-18 NOTE — PROGRESS NOTES
Assessment/Plan:   Chava Ramachandran is a 43 y o male who comes in today for postoperative check s/p excision of lipoma       Pathology: Reviewed with patient, all questions answered  Lipoma    Postoperative restrictions reviewed  All questions answered  ____________________________________________________________    HPI:  Chava Ramachandran is a 43 y o male who comes in today for postoperative check after recent surgery  Currently doing well without problems, no fever or chills,no nausea and no vomiting  Reports c/o tooth pain  No problems with incisons    ROS:  General ROS: negative for - chills, fatigue, fever or night sweats, weight loss  Respiratory ROS: no cough, shortness of breath, or wheezing  Cardiovascular ROS: no chest pain or dyspnea on exertion  Genito-Urinary ROS: no dysuria, trouble voiding, or hematuria  Musculoskeletal ROS: negative for - gait disturbance, joint pain or muscle pain  Neurological ROS: no TIA or stroke symptoms  GI ROS: see HPI  Skin ROS: no new rashes or lesions   Lymphatic ROS: no new adenopathy noted by pt  GYN ROS: see HPI, no new GYN history or bleeding noted  Psy ROS: no new mental or behavioral disturbances       Patient Active Problem List   Diagnosis    Moderate episode of recurrent major depressive disorder (HCC)    Nasal congestion    Lump in chest    Encounter for well adult exam with abnormal findings    Smoking greater than 20 pack years    Overweight (BMI 25 0-29  9)    Lipoma of torso         Allergies:  Patient has no known allergies        Current Outpatient Medications:     citalopram (CeleXA) 20 mg tablet, Take 1 tablet (20 mg total) by mouth daily (Patient not taking: Reported on 6/27/2019), Disp: 30 tablet, Rfl: 2    HYDROcodone-acetaminophen (NORCO) 5-325 mg per tablet, Take 1 tablet by mouth every 4 (four) hours as needed for pain for up to 5 dosesMax Daily Amount: 6 tablets, Disp: 5 tablet, Rfl: 0    nicotine (NICODERM CQ) 21 mg/24 hr TD 24 hr patch, Place 1 patch on the skin every 24 hours (Patient not taking: Reported on 6/27/2019), Disp: 28 patch, Rfl: 0    Past Medical History:   Diagnosis Date    Lipoma     Moderate episode of recurrent major depressive disorder ([facility] [address] ) 2/22/2019    Pain     "lipoma chest sites 3-4"       Past Surgical History:   Procedure Laterality Date    NO PAST SURGERIES      PA EXC SKIN BENIG 3 1-4 CM TRUNK,ARM,LEG N/A 7/11/2019    Procedure: EXCISION LIPOMA X2 MID STERNUM CHEST WALL;  Surgeon: Ewelina Michael MD;  [facility]: [facility];  Service: General    WISDOM TOOTH EXTRACTION         Family History   Problem Relation Age of Onset    Asthma Mother     Hypertension Mother         reports that he has been smoking cigarettes  He has a 20 00 pack-year smoking history  He has never used smokeless tobacco  He reports that he drinks alcohol  He reports that he does not use drugs  Invalid input(s):  EOSPCT          Invalid input(s): LABALBU    Imaging: No new pertinent imaging studies  There were no vitals filed for this visit  PHYSICAL EXAM  General: normal, cooperative, no distress  Incision: clean, dry, and intact and healing well      Some portions of this record may have been generated with voice recognition software  There may be translation, syntax,  or grammatical errors  Occasional wrong word or "sound-a-like" substitutions may have occurred due to the inherent limitations of the voice recognition software  Read the chart carefully and recognize, using context, where substitutions may have occurred  If you have any questions, please contact the dictating provider for clarification or correction, as needed  This encounter has been coded by a non-certified coder         Ewelina Michael MD    Date: 7/18/2019 Time: 3:10 PM

## 2020-03-19 ENCOUNTER — APPOINTMENT (EMERGENCY)
Dept: NON INVASIVE DIAGNOSTICS | Facility: HOSPITAL | Age: 40
End: 2020-03-19
Payer: COMMERCIAL

## 2020-03-19 ENCOUNTER — HOSPITAL ENCOUNTER (EMERGENCY)
Facility: HOSPITAL | Age: 40
Discharge: HOME | End: 2020-03-19
Attending: EMERGENCY MEDICINE | Admitting: EMERGENCY MEDICINE
Payer: COMMERCIAL

## 2020-03-19 ENCOUNTER — APPOINTMENT (EMERGENCY)
Dept: RADIOLOGY | Facility: HOSPITAL | Age: 40
End: 2020-03-19
Payer: COMMERCIAL

## 2020-03-19 VITALS
BODY MASS INDEX: 30.01 KG/M2 | OXYGEN SATURATION: 99 % | TEMPERATURE: 97.5 F | WEIGHT: 180.34 LBS | HEART RATE: 78 BPM | RESPIRATION RATE: 14 BRPM | SYSTOLIC BLOOD PRESSURE: 130 MMHG | DIASTOLIC BLOOD PRESSURE: 88 MMHG

## 2020-03-19 DIAGNOSIS — M79.642 LEFT HAND PAIN: ICD-10-CM

## 2020-03-19 DIAGNOSIS — M79.605 PAIN OF LEFT LOWER EXTREMITY: Primary | ICD-10-CM

## 2020-03-19 PROCEDURE — 93971 EXTREMITY STUDY: CPT | Performed by: SURGERY

## 2020-03-19 PROCEDURE — 99284 EMERGENCY DEPT VISIT MOD MDM: CPT

## 2020-03-19 PROCEDURE — 99284 EMERGENCY DEPT VISIT MOD MDM: CPT | Performed by: PHYSICIAN ASSISTANT

## 2020-03-19 PROCEDURE — 93971 EXTREMITY STUDY: CPT

## 2020-03-19 PROCEDURE — 73110 X-RAY EXAM OF WRIST: CPT

## 2020-03-19 PROCEDURE — 73130 X-RAY EXAM OF HAND: CPT

## 2020-03-19 RX ORDER — IBUPROFEN 400 MG/1
800 TABLET ORAL ONCE
Status: COMPLETED | OUTPATIENT
Start: 2020-03-19 | End: 2020-03-19

## 2020-03-19 RX ORDER — METHOCARBAMOL 500 MG/1
500 TABLET, FILM COATED ORAL EVERY 12 HOURS PRN
Qty: 14 TABLET | Refills: 0 | Status: SHIPPED | OUTPATIENT
Start: 2020-03-19

## 2020-03-19 RX ORDER — IBUPROFEN 800 MG/1
800 TABLET ORAL 3 TIMES DAILY
Qty: 21 TABLET | Refills: 0 | Status: SHIPPED | OUTPATIENT
Start: 2020-03-19

## 2020-03-19 RX ADMIN — IBUPROFEN 800 MG: 400 TABLET ORAL at 09:50

## 2020-03-19 NOTE — ED PROVIDER NOTES
History  Chief Complaint   Patient presents with    Muscle Pain     LLE & LUE pain  tried epson salt and tylenol with no relief  denies weakness, numbness  Patient is a 60-year-old male with no significant past medical history, presents emergency department for evaluation  Patient states 3 days ago he was opening his car door with his left middle finger and 4th finger, when he suddenly felt the pain  Patient localizes pain to left hand and left wrist   Patient is right-hand dominant  Patient has not taken anything for pain  Patient states pain worse with certain small movements  Patient also reports left calf pain which began 3 days ago  Patient states he woke up with the pain  Patient without swelling or redness to left calf  Patient denies injury or trauma  Patient has not been taking anything for his pain  Patient denies hx of DVT/PE, recent travel (long car/plane rides), recent immobilization, recent surgical procedure, estrogen use, active cancer, chest pain, SOB, calf pain/swelling or hemoptysis  None       Past Medical History:   Diagnosis Date    Lipoma     Moderate episode of recurrent major depressive disorder ([facility] [address] ) 2/22/2019    Pain     "lipoma chest sites 3-4"       Past Surgical History:   Procedure Laterality Date    NO PAST SURGERIES      NJ EXC SKIN BENIG 3 1-4 CM TRUNK,ARM,LEG N/A 7/11/2019    Procedure: EXCISION LIPOMA X2 MID STERNUM CHEST WALL;  Surgeon: Danny Escobar MD;  Location: [facility];  Service: General    WISDOM TOOTH EXTRACTION         Family History   Problem Relation Age of Onset    Asthma Mother     Hypertension Mother      I have reviewed and agree with the history as documented      E-Cigarette/Vaping    E-Cigarette Use Never User      E-Cigarette/Vaping Substances     Social History     Tobacco Use    Smoking status: Current Every Day Smoker     Packs/day: 1 00     Years: 20 00     Pack years: 20 00     Types: Cigarettes    Smokeless tobacco: Never Used   Substance Use Topics    Alcohol use: Yes     Frequency: 2-4 times a month     Comment: occasional     Drug use: No       Review of Systems   Constitutional: Negative for chills and fever  HENT: Negative for ear pain and sore throat  Eyes: Negative for redness  Respiratory: Negative for chest tightness and shortness of breath  Cardiovascular: Negative for chest pain and leg swelling  Gastrointestinal: Negative for abdominal pain, diarrhea and vomiting  Musculoskeletal: Negative for back pain and neck pain  Hand pain  Leg pain   Skin: Negative for rash  Neurological: Negative for speech difficulty and weakness  Psychiatric/Behavioral: Negative for confusion  Physical Exam  Physical Exam   Constitutional: He is oriented to person, place, and time  He appears well-developed and well-nourished  HENT:   Head: Normocephalic and atraumatic  Nose: Nose normal    Neck: Normal range of motion  Cardiovascular: Normal rate and regular rhythm  Pulmonary/Chest: Effort normal and breath sounds normal    Musculoskeletal: Normal range of motion  Left wrist: He exhibits normal range of motion, no tenderness and no bony tenderness  Left hip: Normal         Left knee: Normal         Left ankle: Normal         Left hand: He exhibits tenderness (mild )  He exhibits normal range of motion, normal capillary refill, no deformity and no laceration  Normal sensation noted  Normal strength noted  Hands:       Left upper leg: Normal         Right lower leg: He exhibits no tenderness and no swelling  Left lower leg: He exhibits tenderness  He exhibits no bony tenderness, no swelling, no edema, no deformity and no laceration  Neurovascularly intact distally  5/5 strength bilateral upper extremities  Radial pulse 2 +  Cap refill <2 seconds  Sensation intact  Neurovascularly intact distally  5/5 strength bilateral lower extremities  Distal pulses intact    Cap refill <2 seconds  Sensation intact  Neurological: He is alert and oriented to person, place, and time  Skin: Skin is warm and dry  Psychiatric: He has a normal mood and affect  His behavior is normal        Vital Signs  ED Triage Vitals [03/19/20 0910]   Temperature Pulse Respirations Blood Pressure SpO2   97 5 °F (36 4 °C) 78 14 130/88 99 %      Temp Source Heart Rate Source Patient Position - Orthostatic VS BP Location FiO2 (%)   Oral Monitor -- Right arm --      Pain Score       7           Vitals:    03/19/20 0910   BP: 130/88   Pulse: 78         Visual Acuity      ED Medications  Medications   ibuprofen (MOTRIN) tablet 800 mg (800 mg Oral Given 3/19/20 0950)       Diagnostic Studies  Results Reviewed     None                 XR hand 3+ views LEFT   Final Result by Radha Kyle DO (03/19 1056)   No acute osseous abnormality  Workstation performed: [id number]         XR wrist 3+ views LEFT   Final Result by Radha Kyle DO (03/19 1059)      No acute osseous abnormality  Workstation performed: [id number]         VAS lower limb venous duplex study, unilateral/limited    (Results Pending)              Procedures  Procedures         ED Course  ED Course as of Mar 19 1111   Thu Mar 19, 2020   1020 Negative for DVT per US tech   VAS lower limb venous duplex study, unilateral/limited                                 MDM  Number of Diagnoses or Management Options  Left hand pain: new and does not require workup  Pain of left lower extremity: new and does not require workup  Diagnosis management comments: Patient is a 76-year-old male with no significant past medical history, presents emergency department for evaluation  Patient states 3 days ago he was opening his car door with his left middle finger and 4th finger, when he suddenly felt the pain  Patient localizes pain to left hand and left wrist   Patient is right-hand dominant  Patient has not taken anything for pain    Patient states pain worse with certain small movements  Patient also reports left calf pain which began 3 days ago  Patient states he woke up with the pain  Patient without swelling or redness to left calf  Patient denies injury or trauma  Patient has not been taking anything for his pain  Patient denies hx of DVT/PE, recent travel (long car/plane rides), recent immobilization, recent surgical procedure, estrogen use, active cancer, chest pain, SOB, calf pain/swelling or hemoptysis  X-ray left hand and wrist show no acute osseous abnormality  VAS US left lower extremity, no DVT per report of US tech  Explained these results the patient  Suspect sprain of hand and muscular pain of right calf as cause for patient's pain  Rx for Motrin, Robaxin and diclofenac gel provided to patient  Advised no driving on Robaxin as it can cause drowsiness  Information for orthopedics provided patient if symptoms do not improved  Patient verbalizes understanding and agrees with plan  The management plan was discussed in detail with the patient at bedside and all questions were answered  Prior to discharge, I provided both verbal and written instructions  I discussed with the patient the signs and symptoms for which to return to the emergency department  All questions were answered and patient was comfortable with the plan of care and discharged to home  The patient agrees to return to the Emergency Department for concerns and/or progression of illness            Disposition  Final diagnoses:   Pain of left lower extremity   Left hand pain     Time reflects when diagnosis was documented in both MDM as applicable and the Disposition within this note     Time User Action Codes Description Comment    3/19/2020 11:03 AM Mukesh Hurley Add [M79 [id number]] Pain of left lower extremity     3/19/2020 11:03 AM Mukesh Hurley Add [P38 042] Left hand pain       ED Disposition     ED Disposition Condition Date/Time Comment    Discharge Stable Thu Mar 19, 2020 11:02 AM [place] Mis discharge to home/self care  Follow-up Information     Follow up With Specialties Details Why Contact Info Additional Information    Diaz Rios MD Family Medicine Schedule an appointment as soon as possible for a visit   [address] [facility]  [address] [facility] Orth[facility] Surgery Go to  If symptoms worsen [address] [facility] [address] [address] [phone] [address], [address] [facility], [facility] [address], [place], [id number] [phone]          Patient's Medications   Discharge Prescriptions    DICLOFENAC SODIUM (VOLTAREN) 1 %    Apply 2 g topically 4 (four) times a day       Start Date: 3/19/2020 End Date: --       Order Dose: 2 g       Quantity: 100 g    Refills: 0    IBUPROFEN (MOTRIN) 800 MG TABLET    Take 1 tablet (800 mg total) by mouth 3 (three) times a day       Start Date: 3/19/2020 End Date: --       Order Dose: 800 mg       Quantity: 21 tablet    Refills: 0    METHOCARBAMOL (ROBAXIN) 500 MG TABLET    Take 1 tablet (500 mg total) by mouth every 12 (twelve) hours as needed for muscle spasms       Start Date: 3/19/2020 End Date: --       Order Dose: 500 mg       Quantity: 14 tablet    Refills: 0     No discharge procedures on file      PDMP Review     None          ED Provider  Electronically Signed by           Fredi Sullivan PA-C  03/19/20 1111

## (undated) DEVICE — GAUZE SPONGES,16 PLY: Brand: CURITY

## (undated) DEVICE — SYRINGE 10ML LL

## (undated) DEVICE — 2000CC GUARDIAN II: Brand: GUARDIAN

## (undated) DEVICE — 2963 MEDIPORE SOFT CLOTH TAPE 3 IN X 10 YD 12 RLS/CS: Brand: 3M™ MEDIPORE™

## (undated) DEVICE — BETHLEHEM UNIVERSAL MINOR GEN: Brand: CARDINAL HEALTH

## (undated) DEVICE — PLUMEPEN PRO 10FT

## (undated) DEVICE — NEEDLE HYPO 22G X 1-1/2 IN

## (undated) DEVICE — 3M™ STERI-STRIP™ REINFORCED ADHESIVE SKIN CLOSURES, R1547, 1/2 IN X 4 IN (12 MM X 100 MM), 6 STRIPS/ENVELOPE: Brand: 3M™ STERI-STRIP™

## (undated) DEVICE — ELECTRODE NEEDLE MOD E-Z CLEAN 2.75IN 7CM -0013M

## (undated) DEVICE — GLOVE INDICATOR PI UNDERGLOVE SZ 6.5 BLUE

## (undated) DEVICE — CHLORAPREP HI-LITE 26ML ORANGE

## (undated) DEVICE — SUT VICRYL 3-0 SH 27 IN J416H

## (undated) DEVICE — DRAPE EQUIPMENT RF WAND

## (undated) DEVICE — SUT MONOCRYL 4-0 PS-2 27 IN Y426H

## (undated) DEVICE — GLOVE INDICATOR PI UNDERGLOVE SZ 7 BLUE

## (undated) DEVICE — TELFA NON-ADHERENT ABSORBENT DRESSING: Brand: TELFA

## (undated) DEVICE — 3M™ STERI-STRIP™ COMPOUND BENZOIN TINCTURE 40 BAGS/CARTON 4 CARTONS/CASE C1544: Brand: 3M™ STERI-STRIP™

## (undated) DEVICE — GLOVE SRG BIOGEL 6.5

## (undated) DEVICE — SCD SEQUENTIAL COMPRESSION COMFORT SLEEVE MEDIUM KNEE LENGTH: Brand: KENDALL SCD

## (undated) DEVICE — INTENDED FOR TISSUE SEPARATION, AND OTHER PROCEDURES THAT REQUIRE A SHARP SURGICAL BLADE TO PUNCTURE OR CUT.: Brand: BARD-PARKER SAFETY BLADES SIZE 15, STERILE

## (undated) DEVICE — COVER ROLL 8 IN X 2 YD STRETCH TAPE